# Patient Record
Sex: MALE | Race: WHITE
[De-identification: names, ages, dates, MRNs, and addresses within clinical notes are randomized per-mention and may not be internally consistent; named-entity substitution may affect disease eponyms.]

---

## 2017-08-03 ENCOUNTER — HOSPITAL ENCOUNTER (EMERGENCY)
Dept: HOSPITAL 41 - JD.ED | Age: 32
Discharge: HOME | End: 2017-08-03
Payer: MEDICARE

## 2017-08-03 VITALS — DIASTOLIC BLOOD PRESSURE: 145 MMHG | SYSTOLIC BLOOD PRESSURE: 160 MMHG

## 2017-08-03 DIAGNOSIS — Z98.890: ICD-10-CM

## 2017-08-03 DIAGNOSIS — G44.209: Primary | ICD-10-CM

## 2017-08-03 DIAGNOSIS — F17.210: ICD-10-CM

## 2017-08-03 PROCEDURE — 96374 THER/PROPH/DIAG INJ IV PUSH: CPT

## 2017-08-03 PROCEDURE — 80053 COMPREHEN METABOLIC PANEL: CPT

## 2017-08-03 PROCEDURE — 85025 COMPLETE CBC W/AUTO DIFF WBC: CPT

## 2017-08-03 PROCEDURE — 36415 COLL VENOUS BLD VENIPUNCTURE: CPT

## 2017-08-03 PROCEDURE — 99284 EMERGENCY DEPT VISIT MOD MDM: CPT

## 2017-08-03 PROCEDURE — 70450 CT HEAD/BRAIN W/O DYE: CPT

## 2017-08-03 PROCEDURE — 96375 TX/PRO/DX INJ NEW DRUG ADDON: CPT

## 2017-08-03 NOTE — CT
Head CT

 

Technique: Multiple axial sections through the brain were obtained.  

Intravenous contrast was not utilized.

 

Comparison: Previous head CT exam of 08/24/14 and MRI brain of 

08/26/14.

 

Findings: Ventricles along with basal cisterns and sulci over the 

convexities are within normal limits for the patient's age.  No 

abnormal parenchymal densities are seen.  No evidence of intracranial 

hemorrhage.  No midline shift or mass effect is seen.  Bone window 

settings were reviewed which shows the visualized sinuses to appear 

clear.  No acute calvarial abnormality is appreciated.

 

Impression:

1.  Nothing acute is appreciated on noncontrast head CT exam.  No 

significant change is seen from prior head CT exam.

 

Diagnostic code #1

## 2017-08-03 NOTE — EDM.PDOC
ED HPI GENERAL MEDICAL PROBLEM





- General


Chief Complaint: Headache


Stated Complaint: Headache


Time Seen by Provider: 08/03/17 19:30


Source of Information: Reports: Patient, RN Notes Reviewed


History Limitations: Reports: No Limitations





- History of Present Illness


INITIAL COMMENTS - FREE TEXT/NARRATIVE: 





31 year old male presents to the ED with 2-3 day history of severe headache. 

The pain is located to his entire head and neck. The symptoms started 

gradually. He has tried over the counter medications with no relief. He has 

associated blurry vision, dizziness, photophobia, phonophobia, nausea, and 

vomiting. He feels off balance at times. No weakness, slurred speech, 

difficulty swallowing, or facial droop. He has a history of a traumatic head 

injury and seizure disorder. His Neurology provider is at [facility] but he cannot 

remember their name. Two to three weeks ago they changed his seizure medication 

from immediate release to XR. He has tried contacting his Neurologist and was 

told to cut back on the dose. He's unsure if this is causing his headaches. He 

also says he feels confused at times and doesn't remember periods of time. I 

asked if he feels he is having a seizure. He says normally he can tell because 

he will break teeth or bite his tongue if he has a seizure which has not 

happened recently. 


  ** Headache


Pain Score (Numeric/FACES): 7





- Related Data


 Allergies











Allergy/AdvReac Type Severity Reaction Status Date / Time


 


No Known Allergies Allergy   Verified 08/03/17 19:20











Home Meds: 


 Home Meds





traMADol [Ultram] 50 mg PO BID PRN #14 tablet 03/12/17 [Rx]


OXcarbazepine [Trileptal] 900 mg PO BEDTIME 08/03/17 [History]











Past Medical History


HEENT History: Reports: Impaired Vision


Cardiovascular History: Reports: Heart Murmur


Neurological History: Reports: Other (See Below)


Other Neuro History: brain injury





- Infectious Disease History


Infectious Disease History: Reports: Chicken Pox





- Past Surgical History


GI Surgical History: Reports: Other (See Below)


Musculoskeletal Surgical History: Reports: Other (See Below)


Other Musculoskeletal Surgeries/Procedures:: back surgery





Social & Family History





- Tobacco Use


Smoking Status *Q: Current Every Day Smoker


Years of Tobacco use: 16


Packs/Tins Daily: 1


Used Tobacco, but Quit: No





- Caffeine Use


Caffeine Use: Reports: Coffee, Energy Drinks, Soda





- Alcohol Use


Days Per Week of Alcohol Use: 1


Number of Drinks Per Day: 1


Total Drinks Per Week: 1





- Recreational Drug Use


Recreational Drug Use: No


Drug Use in Last 12 Months: No


Recreational Drug Type: Reports: Other (see below)


Other Recreational Drug Type: pt states that he had an opiate addiction with 

morphine and methadone





ED ROS GENERAL





- Review of Systems


Review Of Systems: See Below


Constitutional: Reports: No Symptoms.  Denies: Fever, Chills


Respiratory: Reports: No Symptoms.  Denies: Shortness of Breath


Cardiovascular: Reports: No Symptoms.  Denies: Chest Pain


GI/Abdominal: Reports: Nausea, Vomiting.  Denies: Abdominal Pain


Musculoskeletal: Reports: Neck Pain


Neurological: Reports: Confusion, Dizziness, Headache.  Denies: Numbness, 

Seizure, Syncope, Tingling, Trouble Speaking, Difficulty Walking, Weakness, 

Change in Speech





- Physical Exam


Exam: See Below


Exam Limited By: No Limitations


General Appearance: Alert, WD/WN, No Apparent Distress


Eye Exam: Bilateral Eye: EOMI, PERRL


Head Exam: Atraumatic, Normocephalic


Neck: Normal Inspection, Supple, Non-Tender, Full Range of Motion, Other (no 

nuchal rigidity )


Respiratory/Chest: No Respiratory Distress, Lungs Clear, Normal Breath Sounds


Cardiovascular: No Edema, No Murmur, Tachycardia


GI/Abdominal: Normal Bowel Sounds, Soft, Non-Tender


Neuro Exam (Abbreviated): Alert, Oriented, CN II-XII Intact, Normal Cognition, 

Normal Gait, No Motor/Sensory Deficits, Other (cerebellar testing (finger-to-

nose, rapid alternating movements) are intact. )


Skin Exam: Warm, Dry, Intact





Course





- Vital Signs


Last Recorded V/S: 


 Last Vital Signs











Temp  97.7 F   08/03/17 19:17


 


Pulse  127 H  08/03/17 19:17


 


Resp  16   08/03/17 19:17


 


BP  160/145 H  08/03/17 19:17


 


Pulse Ox  100   08/03/17 19:17














- Orders/Labs/Meds


Orders: 


 Active Orders 24 hr











 Category Date Time Status


 


 Peripheral IV Care [RC] .AS DIRECTED Care  08/03/17 19:43 Active


 


 Sodium Chloride 0.9% [Saline Flush] Med  08/03/17 19:43 Active





 10 ml FLUSH ASDIRECTED PRN   


 


 Peripheral IV Insertion Adult [OM.PC] Stat Oth  08/03/17 19:43 Ordered








 Medication Orders





Sodium Chloride (Saline Flush)  10 ml FLUSH ASDIRECTED PRN


   PRN Reason: Keep Vein Open


   Last Admin: 08/03/17 19:55  Dose: 10 ml








Labs: 


 Laboratory Tests











  08/03/17 08/03/17 Range/Units





  19:34 19:38 


 


WBC   10.39 H  (4.23-9.07)  K/mm3


 


RBC   5.18  (4.63-6.08)  M/mm3


 


Hgb   16.8  (13.7-17.5)  gm/L


 


Hct   46.1  (40.1-51.0)  %


 


MCV   89.0  (79.0-92.2)  fl


 


MCH   32.4 H  (25.7-32.2)  pg


 


MCHC   36.4 H  (32.2-35.5)  g/dl


 


RDW Std Deviation   42.8  (35.1-43.9)  fL


 


Plt Count   347 H  (163-337)  K/mm3


 


MPV   9.1 L  (9.4-12.3)  fl


 


Neut % (Auto)   64.2  (34.0-67.9)  %


 


Lymph % (Auto)   29.7  (21.8-53.1)  %


 


Mono % (Auto)   4.6 L  (5.3-12.2)  %


 


Eos % (Auto)   1.1  (0.8-7.0)  


 


Baso % (Auto)   0.3  (0.1-1.2)  %


 


Neut # (Auto)   6.67 H  (1.78-5.38)  K/mm3


 


Lymph # (Auto)   3.09  (1.32-3.57)  K/mm3


 


Mono # (Auto)   0.48  (0.30-0.82)  K/mm3


 


Eos # (Auto)   0.11  (0.04-0.54)  K/mm3


 


Baso # (Auto)   0.03  (0.01-0.08)  K/mm3


 


Sodium  141   (136-145)  mEq/L


 


Potassium  3.7   (3.5-5.1)  mEq/L


 


Chloride  102   ()  mEq/L


 


Carbon Dioxide  32   (21-32)  mEq/L


 


Anion Gap  10.7   (5-15)  


 


BUN  9   (7-18)  mg/dL


 


Creatinine  1.1   (0.7-1.3)  mg/dL


 


Est Cr Clr Drug Dosing  98.54   mL/min


 


Estimated GFR (MDRD)  > 60   (>60)  mL/min


 


BUN/Creatinine Ratio  8.2 L   (14-18)  


 


Glucose  110 H   ()  mg/dL


 


Calcium  9.6   (8.5-10.1)  mg/dL


 


Total Bilirubin  0.6   (0.2-1.0)  mg/dL


 


AST  TNP   


 


ALT  TNP   


 


Alkaline Phosphatase  68   ()  U/L


 


Total Protein  8.0   (6.4-8.2)  g/dl


 


Albumin  5.0   (3.4-5.0)  g/dl


 


Globulin  3.1   gm/dL


 


Albumin/Globulin Ratio  1.6   (1-2)  











Meds: 


Medications











Generic Name Dose Route Start Last Admin





  Trade Name Freq  PRN Reason Stop Dose Admin


 


Sodium Chloride  10 ml  08/03/17 19:43  08/03/17 19:55





  Saline Flush  FLUSH   10 ml





  ASDIRECTED PRN   Administration





  Keep Vein Open   














Discontinued Medications














Generic Name Dose Route Start Last Admin





  Trade Name Freq  PRN Reason Stop Dose Admin


 


Diazepam  5 mg  08/03/17 21:33  08/03/17 21:41





  Valium  IV  08/03/17 21:34  5 mg





  ONETIME ONE   Administration


 


Diphenhydramine HCl  25 mg  08/03/17 19:43  08/03/17 19:52





  Benadryl  IVPUSH  08/03/17 19:44  25 mg





  ONETIME ONE   Administration


 


Fentanyl  50 mcg  08/03/17 20:40  08/03/17 20:44





  Sublimaze  IVPUSH  08/03/17 20:41  50 mcg





  ONETIME ONE   Administration


 


Ketorolac Tromethamine  30 mg  08/03/17 19:43  08/03/17 19:54





  Toradol  IVPUSH  08/03/17 19:44  30 mg





  ONETIME ONE   Administration


 


Metoclopramide HCl  5 mg  08/03/17 19:43  08/03/17 19:48





  Reglan  IVPUSH  08/03/17 19:44  5 mg





  ONETIME ONE   Administration


 


Tramadol HCl  50 mg  08/03/17 20:40  08/03/17 20:45





  Ultram  PO  08/03/17 20:41  50 mg





  ONETIME ONE   Administration














- Re-Assessments/Exams


Free Text/Narrative Re-Assessment/Exam: 





Initial treatment included Toradol, Benadryl, Reglan, and IV fluids. 





CBC and CMP unremarkable. 





CT of head was obtained due to history and complaints of vision changes and 

confusion. Head CT read by Dr. Chairez and is negative for acute findings. 





He has tenderness to neck muscles with neck spasm. Diagnosis is tension 

headache. He had no relief with initial medications as listed above. He was 

then given Valium 5mg IV and had complete relief in symptoms within 10 minutes. 

He will be discharged home. Considered prescription for muscle relaxers however

, several muscle relaxers can decrease seizure threshold and therefore were not 

ordered. He was educated on supportive care and follow-up instructions. 








Departure





- Departure


Time of Disposition: 22:05


Disposition: Home, Self-Care 01


Condition: Good


Clinical Impression: 


 Tension-type headache








- Discharge Information


Instructions:  Migraine Headache


Referrals: 


Pasquale Brown Jr, MD [Primary Care Provider] - 


Forms:  ED Department Discharge


Additional Instructions: 


Heating pad to neck


Massage to help relax muscles


Consider seeing a Chiropractor. I recommend Dr. Goldberg at [facility] 


Tylenol or Ibuprofen as needed for pain


Tramadol as needed for more severe pain


Return to ER with worsening symptoms, fever, or additional concerns


No driving today due to sedating medications given in the ER








- My Orders


Last 24 Hours: 


My Active Orders





08/03/17 19:43


Peripheral IV Care [RC] .AS DIRECTED 


Sodium Chloride 0.9% [Saline Flush]   10 ml FLUSH ASDIRECTED PRN 


Peripheral IV Insertion Adult [OM.PC] Stat 














- Assessment/Plan


Last 24 Hours: 


My Active Orders





08/03/17 19:43


Peripheral IV Care [RC] .AS DIRECTED 


Sodium Chloride 0.9% [Saline Flush]   10 ml FLUSH ASDIRECTED PRN 


Peripheral IV Insertion Adult [OM.PC] Stat

## 2017-12-23 ENCOUNTER — HOSPITAL ENCOUNTER (EMERGENCY)
Dept: HOSPITAL 41 - JD.ED | Age: 32
Discharge: HOME | End: 2017-12-23
Payer: MEDICAID

## 2017-12-23 VITALS — DIASTOLIC BLOOD PRESSURE: 111 MMHG | SYSTOLIC BLOOD PRESSURE: 155 MMHG

## 2017-12-23 DIAGNOSIS — M54.9: ICD-10-CM

## 2017-12-23 DIAGNOSIS — R07.89: Primary | ICD-10-CM

## 2017-12-23 DIAGNOSIS — F17.210: ICD-10-CM

## 2017-12-23 PROCEDURE — 80306 DRUG TEST PRSMV INSTRMNT: CPT

## 2017-12-23 PROCEDURE — 96374 THER/PROPH/DIAG INJ IV PUSH: CPT

## 2017-12-23 PROCEDURE — 85379 FIBRIN DEGRADATION QUANT: CPT

## 2017-12-23 PROCEDURE — 96361 HYDRATE IV INFUSION ADD-ON: CPT

## 2017-12-23 PROCEDURE — 85025 COMPLETE CBC W/AUTO DIFF WBC: CPT

## 2017-12-23 PROCEDURE — 80053 COMPREHEN METABOLIC PANEL: CPT

## 2017-12-23 PROCEDURE — 85730 THROMBOPLASTIN TIME PARTIAL: CPT

## 2017-12-23 PROCEDURE — 84443 ASSAY THYROID STIM HORMONE: CPT

## 2017-12-23 PROCEDURE — 81001 URINALYSIS AUTO W/SCOPE: CPT

## 2017-12-23 PROCEDURE — 99285 EMERGENCY DEPT VISIT HI MDM: CPT

## 2017-12-23 PROCEDURE — 36415 COLL VENOUS BLD VENIPUNCTURE: CPT

## 2017-12-23 PROCEDURE — 85610 PROTHROMBIN TIME: CPT

## 2017-12-23 PROCEDURE — 86140 C-REACTIVE PROTEIN: CPT

## 2017-12-23 PROCEDURE — 71010: CPT

## 2017-12-23 PROCEDURE — 84484 ASSAY OF TROPONIN QUANT: CPT

## 2017-12-23 PROCEDURE — 93005 ELECTROCARDIOGRAM TRACING: CPT
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
## 2017-12-26 NOTE — CR
Chest: Portable view of the chest was obtained.

 

Comparison: Prior chest x-ray of 11/18/14.

 

Heart size and mediastinum are normal.  Lungs are clear.  Bony 

structures are grossly intact.

 

Impression:

1.  Nothing acute is identified on portable chest x-ray.

 

Diagnostic code #2

## 2018-02-18 ENCOUNTER — HOSPITAL ENCOUNTER (EMERGENCY)
Dept: HOSPITAL 41 - JD.ED | Age: 33
Discharge: HOME | End: 2018-02-18
Payer: MEDICARE

## 2018-02-18 VITALS — SYSTOLIC BLOOD PRESSURE: 172 MMHG | DIASTOLIC BLOOD PRESSURE: 100 MMHG
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
## 2018-02-18 DIAGNOSIS — F17.210: ICD-10-CM
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
## 2018-02-18 DIAGNOSIS — S33.8XXA: Primary | ICD-10-CM
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
## 2018-02-18 PROCEDURE — 96374 THER/PROPH/DIAG INJ IV PUSH: CPT

## 2018-02-18 PROCEDURE — 81001 URINALYSIS AUTO W/SCOPE: CPT

## 2018-02-18 PROCEDURE — 85025 COMPLETE CBC W/AUTO DIFF WBC: CPT

## 2018-02-18 PROCEDURE — 96375 TX/PRO/DX INJ NEW DRUG ADDON: CPT

## 2018-02-18 PROCEDURE — 96361 HYDRATE IV INFUSION ADD-ON: CPT

## 2018-02-18 PROCEDURE — 99284 EMERGENCY DEPT VISIT MOD MDM: CPT

## 2018-02-18 PROCEDURE — 72193 CT PELVIS W/DYE: CPT

## 2018-02-18 PROCEDURE — 80053 COMPREHEN METABOLIC PANEL: CPT

## 2018-02-18 PROCEDURE — 36415 COLL VENOUS BLD VENIPUNCTURE: CPT

## 2018-02-18 RX ADMIN — Medication PRN ML: at 12:13

## 2018-02-18 RX ADMIN — Medication PRN ML: at 13:46
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
## 2018-02-19 NOTE — CT
CT pelvis



Technique: Multiple axial sections through the pelvis were obtained.  
Intravenous contrast was utilized.  No oral contrast has been given.



Findings: No abnormal soft tissue density is seen within the perirectal fat.  
No inflammatory change is seen within the pelvis.  No fluid collections are 
seen within the pelvis.  Normal enhancing vessels are noted.  No inguinal or 
pelvic adenopathy is seen.  No acute bony abnormality is appreciated.



Impression:

1.  No abnormality is seen on CT study of the pelvis.



Diagnostic code #1
MTDD

## 2018-05-26 ENCOUNTER — HOSPITAL ENCOUNTER (EMERGENCY)
Dept: HOSPITAL 41 - JD.ED | Age: 33
Discharge: HOME | End: 2018-05-26
Payer: MEDICARE

## 2018-05-26 VITALS — SYSTOLIC BLOOD PRESSURE: 134 MMHG | DIASTOLIC BLOOD PRESSURE: 82 MMHG

## 2018-05-26 DIAGNOSIS — S06.0X0A: Primary | ICD-10-CM

## 2018-05-26 DIAGNOSIS — M54.2: ICD-10-CM

## 2018-05-26 DIAGNOSIS — F17.210: ICD-10-CM

## 2018-05-26 DIAGNOSIS — S00.03XA: ICD-10-CM
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
## 2018-05-26 PROCEDURE — 72125 CT NECK SPINE W/O DYE: CPT

## 2018-05-26 PROCEDURE — 70450 CT HEAD/BRAIN W/O DYE: CPT

## 2018-05-26 PROCEDURE — 99284 EMERGENCY DEPT VISIT MOD MDM: CPT
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
## 2018-05-28 NOTE — CT
Head CT

 

Technique: Multiple axial sections through the brain were obtained.  

Intravenous contrast was not utilized.

 

Comparison: Prior head CT exam of 08/03/17.

 

Findings: Ventricles along with basal cisterns and sulci over the 

convexities are within normal limits for the patient's age.  No 

abnormal parenchymal densities are seen.  No evidence of intracranial 

hemorrhage.  No midline shift or mass effect is seen.

 

Bone window settings were reviewed which show no acute calvarial 

abnormality.

 

Impression:

1.  Nothing acute is seen on noncontrast head CT study.  No 

appreciable change is seen from previous study.

 

Diagnostic code #1

 

Agree with preliminary report issued by [facility] ([facility] 

preliminary report dictated on 05/26/18, 8:51 PM Central Time)

## 2018-05-28 NOTE — CT
CT cervical spine

 

Technique: Multiple axial sections were obtained from above C1 

inferiorly to the top of T1.  Reconstructed sagittal and coronal 

images were reviewed.

 

Comparison: No prior CT cervical spine exam, previous plain film 

cervical spine study dated 03/28/15.

 

Findings: Vertebral body heights and disc spaces are maintained.  

Posterior skull base is intact.  Vertebral bodies and posterior arches

 are intact with no fracture being seen.  No bony central or bony 

neural foraminal stenosis is seen.  No abnormal subluxation is seen on

 the reconstructed sagittal images.

 

Impression:

1.  Nothing acute is seen on CT study of the cervical spine.

 

Diagnostic code #1

 

Agree with preliminary report issued by [facility] ([facility] 

preliminary report dictated on 05/26/18, 08:52 PM Central Time)

## 2018-07-04 ENCOUNTER — HOSPITAL ENCOUNTER (EMERGENCY)
Dept: HOSPITAL 41 - JD.ED | Age: 33
LOS: 1 days | Discharge: NURSING, REHAB OR LONG-TERM CARE | End: 2018-07-05
Payer: MEDICARE
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
## 2018-07-04 DIAGNOSIS — S06.6X9A: Primary | ICD-10-CM

## 2018-07-04 DIAGNOSIS — S80.212A: ICD-10-CM

## 2018-07-04 DIAGNOSIS — F10.129: ICD-10-CM
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
## 2018-07-04 DIAGNOSIS — S80.211A: ICD-10-CM

## 2018-07-04 PROCEDURE — 80053 COMPREHEN METABOLIC PANEL: CPT

## 2018-07-04 PROCEDURE — 96368 THER/DIAG CONCURRENT INF: CPT

## 2018-07-04 PROCEDURE — 85007 BL SMEAR W/DIFF WBC COUNT: CPT

## 2018-07-04 PROCEDURE — 85730 THROMBOPLASTIN TIME PARTIAL: CPT

## 2018-07-04 PROCEDURE — 71045 X-RAY EXAM CHEST 1 VIEW: CPT

## 2018-07-04 PROCEDURE — 36600 WITHDRAWAL OF ARTERIAL BLOOD: CPT

## 2018-07-04 PROCEDURE — 99291 CRITICAL CARE FIRST HOUR: CPT

## 2018-07-04 PROCEDURE — 96365 THER/PROPH/DIAG IV INF INIT: CPT

## 2018-07-04 PROCEDURE — 51702 INSERT TEMP BLADDER CATH: CPT

## 2018-07-04 PROCEDURE — 96376 TX/PRO/DX INJ SAME DRUG ADON: CPT

## 2018-07-04 PROCEDURE — 96361 HYDRATE IV INFUSION ADD-ON: CPT

## 2018-07-04 PROCEDURE — 85027 COMPLETE CBC AUTOMATED: CPT

## 2018-07-04 PROCEDURE — G0480 DRUG TEST DEF 1-7 CLASSES: HCPCS

## 2018-07-04 PROCEDURE — 83735 ASSAY OF MAGNESIUM: CPT

## 2018-07-04 PROCEDURE — 82803 BLOOD GASES ANY COMBINATION: CPT

## 2018-07-04 PROCEDURE — 85610 PROTHROMBIN TIME: CPT

## 2018-07-04 PROCEDURE — 99292 CRITICAL CARE ADDL 30 MIN: CPT

## 2018-07-04 PROCEDURE — 36415 COLL VENOUS BLD VENIPUNCTURE: CPT

## 2018-07-04 PROCEDURE — 70450 CT HEAD/BRAIN W/O DYE: CPT

## 2018-07-04 PROCEDURE — 80306 DRUG TEST PRSMV INSTRMNT: CPT

## 2018-07-04 PROCEDURE — 31500 INSERT EMERGENCY AIRWAY: CPT

## 2018-07-04 PROCEDURE — 96375 TX/PRO/DX INJ NEW DRUG ADDON: CPT
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
## 2018-07-05 VITALS — SYSTOLIC BLOOD PRESSURE: 106 MMHG | DIASTOLIC BLOOD PRESSURE: 67 MMHG

## 2018-07-05 NOTE — CT
Head CT

 

Technique: Multiple axial sections through the brain were obtained.  

Intravenous contrast was not utilized.

 

Comparison: Previous head CT study of 05/26/18.

 

Findings: Subarachnoid blood is seen within the suprasellar cistern as

 well as within the sylvian fissures on both sides, worse on the left 

side.  Subarachnoid blood extends into the interhemispheric fissure 

anteriorly as well as subarachnoid blood within the sulci of several 

frontal sulci.  Blood does not extend into the ventricles.

 

No abnormal parenchymal densities are seen.  No midline shift or mass 

effect is seen.

 

Small area of soft tissue swelling is seen within the posterior scalp.

  Mucosal thickening seen within the ethmoid and maxillary sinuses.  

No acute calvarial abnormality is seen.

 

Impression:

1.  Subarachnoid blood as described above.

2.  Sinus disease.

3.  Incidental soft tissue swelling within the posterior scalp.

 

Diagnostic code #5

 

I agree with preliminary report from [facility], finalized at 07/05/18, 

12:55 AM Central Time

## 2018-07-05 NOTE — CR
Chest: Frontal view of the chest was obtained.

 

Comparison: Prior chest x-ray of 12/23/17.

 

Heart size and mediastinum are normal.  Lungs are clear.  Endotracheal

 tube is seen with tip lying at the level of the clavicles.  

Nasogastric tube is partially visualized with tip appearing to lie 

near the gastroesophageal junction within the distal esophagus.  Bony 

structures are grossly intact.

 

Impression:

1.  Tip of endotracheal tube appears to lie at the level of the 

clavicles in satisfactory position.

2.  Tip of nasogastric tube appears to be within the distal esophagus 

near the gastroesophageal junction.

3.  Nothing acute is otherwise seen on frontal supine chest x-ray.

 

Diagnostic code #3

## 2018-07-13 ENCOUNTER — HOSPITAL ENCOUNTER (EMERGENCY)
Dept: HOSPITAL 41 - JD.ED | Age: 33
Discharge: HOME | End: 2018-07-13
Payer: COMMERCIAL

## 2018-07-13 VITALS — DIASTOLIC BLOOD PRESSURE: 72 MMHG | SYSTOLIC BLOOD PRESSURE: 114 MMHG
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
## 2018-07-13 DIAGNOSIS — F10.129: ICD-10-CM

## 2018-07-13 DIAGNOSIS — F17.210: ICD-10-CM

## 2018-07-13 DIAGNOSIS — M62.830: Primary | ICD-10-CM

## 2018-07-13 PROCEDURE — 99283 EMERGENCY DEPT VISIT LOW MDM: CPT

## 2018-07-13 PROCEDURE — 72100 X-RAY EXAM L-S SPINE 2/3 VWS: CPT

## 2018-07-13 PROCEDURE — 96372 THER/PROPH/DIAG INJ SC/IM: CPT

## 2018-07-13 PROCEDURE — 72220 X-RAY EXAM SACRUM TAILBONE: CPT
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
## 2018-07-16 NOTE — CR
Sacrum and coccyx: Three views of the sacrum and coccyx were obtained.

 

Comparison: Prior CT pelvis study.

 

Anterior angulated coccyx is seen which is a chronic finding.  No 

acute fracture is seen.  Sacroiliac joints are within normal limits.  

Sacral foramen appear patent.

 

Impression:

1.  Nothing acute is seen on sacrum and coccyx exam.

 

Diagnostic code #2

## 2018-07-16 NOTE — CR
Lumbar spine: AP, lateral and coned-down lateral views centered to the

 lumbosacral junction were obtained.

 

Comparison: No prior lumbar spine study.

 

Vertebral body heights and disc spaces are maintained.  Pedicles as 

well as visualized transverse and spinous processes are intact.  No 

subluxation or fracture is seen.

 

Impression:

1.  No abnormality is seen on three-view lumbar spine study.

 

Diagnostic code #2

## 2019-01-07 ENCOUNTER — HOSPITAL ENCOUNTER (EMERGENCY)
Dept: HOSPITAL 41 - JD.ED | Age: 34
Discharge: HOME | End: 2019-01-07
Payer: COMMERCIAL

## 2019-01-07 VITALS — SYSTOLIC BLOOD PRESSURE: 124 MMHG | DIASTOLIC BLOOD PRESSURE: 72 MMHG

## 2019-01-07 DIAGNOSIS — F32.9: ICD-10-CM

## 2019-01-07 DIAGNOSIS — Z79.899: ICD-10-CM

## 2019-01-07 DIAGNOSIS — M94.0: Primary | ICD-10-CM

## 2019-01-07 DIAGNOSIS — F17.210: ICD-10-CM

## 2019-01-07 DIAGNOSIS — F41.9: ICD-10-CM

## 2019-01-07 PROCEDURE — G0480 DRUG TEST DEF 1-7 CLASSES: HCPCS

## 2019-01-07 PROCEDURE — 93005 ELECTROCARDIOGRAM TRACING: CPT

## 2019-01-07 PROCEDURE — 85007 BL SMEAR W/DIFF WBC COUNT: CPT

## 2019-01-07 PROCEDURE — 83690 ASSAY OF LIPASE: CPT

## 2019-01-07 PROCEDURE — 71045 X-RAY EXAM CHEST 1 VIEW: CPT

## 2019-01-07 PROCEDURE — 36415 COLL VENOUS BLD VENIPUNCTURE: CPT

## 2019-01-07 PROCEDURE — 80053 COMPREHEN METABOLIC PANEL: CPT

## 2019-01-07 PROCEDURE — 80306 DRUG TEST PRSMV INSTRMNT: CPT

## 2019-01-07 PROCEDURE — 81001 URINALYSIS AUTO W/SCOPE: CPT

## 2019-01-07 PROCEDURE — 99285 EMERGENCY DEPT VISIT HI MDM: CPT

## 2019-01-07 PROCEDURE — 85730 THROMBOPLASTIN TIME PARTIAL: CPT

## 2019-01-07 PROCEDURE — 85610 PROTHROMBIN TIME: CPT

## 2019-01-07 PROCEDURE — 86140 C-REACTIVE PROTEIN: CPT

## 2019-01-07 PROCEDURE — 85027 COMPLETE CBC AUTOMATED: CPT

## 2019-01-07 PROCEDURE — 85379 FIBRIN DEGRADATION QUANT: CPT
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
## 2019-01-08 NOTE — CR
Chest: Portable view of the chest was obtained.

 

Comparison: Previous chest x-ray of 07/05/18.

 

Heart size and mediastinum are within normal limits for portable 

technique.  Lungs are clear.  Bony structures are grossly intact.

 

Impression:

1.  Nothing acute is appreciated on portable chest x-ray.

 

Diagnostic code #1
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
## 2021-03-07 NOTE — CT
Head CT

 

Technique: Multiple axial sections through the brain were obtained.  

Intravenous contrast was not utilized.  Reconstructed coronal and 

sagittal images were obtained.

 

Comparison: Prior head CT study of 11/28/18.

 

Findings: Ventricles along with basal cisterns and sulci over the 

convexities are within normal limits for the patient's age.  No 

abnormal parenchymal densities are seen.  No evidence of intracranial 

hemorrhage.  No midline shift or mass-effect is appreciated.

 

Bone window settings were reviewed and show no acute abnormality 

within the visualized mastoid sinuses or paranasal sinuses.  No acute 

calvarial abnormality is appreciated.

 

Impression:

1.  Nothing acute is appreciated on noncontrast CT study of the brain.

 

Diagnostic code #1
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
## 2021-03-07 NOTE — CR
Chest: Portable view of the chest was obtained.

 

Comparison: Prior chest x-ray of 01/07/19.

 

Heart size and mediastinum are normal.  Lungs are clear with no acute 

parenchymal change.  No acute osseous abnormality is appreciated.

 

Impression:

1.  Nothing acute is appreciated on portable chest x-ray.

 

Diagnostic code #1